# Patient Record
Sex: FEMALE
[De-identification: names, ages, dates, MRNs, and addresses within clinical notes are randomized per-mention and may not be internally consistent; named-entity substitution may affect disease eponyms.]

---

## 2020-10-09 PROBLEM — Z00.00 ENCOUNTER FOR PREVENTIVE HEALTH EXAMINATION: Status: ACTIVE | Noted: 2020-10-09

## 2020-10-14 ENCOUNTER — APPOINTMENT (OUTPATIENT)
Dept: BARIATRICS | Facility: CLINIC | Age: 25
End: 2020-10-14
Payer: MEDICAID

## 2020-10-14 VITALS
SYSTOLIC BLOOD PRESSURE: 129 MMHG | BODY MASS INDEX: 37.65 KG/M2 | WEIGHT: 278 LBS | HEIGHT: 72 IN | TEMPERATURE: 97.4 F | HEART RATE: 75 BPM | OXYGEN SATURATION: 97 % | DIASTOLIC BLOOD PRESSURE: 84 MMHG

## 2020-10-14 DIAGNOSIS — E78.1 PURE HYPERGLYCERIDEMIA: ICD-10-CM

## 2020-10-14 DIAGNOSIS — Z82.49 FAMILY HISTORY OF ISCHEMIC HEART DISEASE AND OTHER DISEASES OF THE CIRCULATORY SYSTEM: ICD-10-CM

## 2020-10-14 DIAGNOSIS — E28.2 POLYCYSTIC OVARIAN SYNDROME: ICD-10-CM

## 2020-10-14 DIAGNOSIS — E11.59 TYPE 2 DIABETES MELLITUS WITH OTHER CIRCULATORY COMPLICATIONS: ICD-10-CM

## 2020-10-14 DIAGNOSIS — E66.01 MORBID (SEVERE) OBESITY DUE TO EXCESS CALORIES: ICD-10-CM

## 2020-10-14 DIAGNOSIS — E11.9 TYPE 2 DIABETES MELLITUS W/OUT COMPLICATIONS: ICD-10-CM

## 2020-10-14 DIAGNOSIS — E03.9 HYPOTHYROIDISM, UNSPECIFIED: ICD-10-CM

## 2020-10-14 DIAGNOSIS — E78.00 PURE HYPERCHOLESTEROLEMIA, UNSPECIFIED: ICD-10-CM

## 2020-10-14 DIAGNOSIS — Z80.0 FAMILY HISTORY OF MALIGNANT NEOPLASM OF DIGESTIVE ORGANS: ICD-10-CM

## 2020-10-14 DIAGNOSIS — Z83.3 FAMILY HISTORY OF DIABETES MELLITUS: ICD-10-CM

## 2020-10-14 PROCEDURE — 99203 OFFICE O/P NEW LOW 30 MIN: CPT

## 2020-10-14 NOTE — ADDENDUM
[FreeTextEntry1] : This note was written by Melissa Ford on 08/19/2020 acting as scribe for Dr. Fragoso.

## 2020-10-14 NOTE — HISTORY OF PRESENT ILLNESS
[de-identified] : Nhi Nicole is a 23 y/o F who has a masters degree in English Literature, highly educated, applying for PhD. She was first seen by an endocrinologist at 8 years old when she was diagnosed with insulin resistance, placed on Metformin, and also diagnosed with hypothyroidism for which she is on Synthroid. Through adolescence she has been diagnosed with PCOS based on irregular menses, ovarian cysts, high testosterone, as well as inverted LH/FSH ratios on blood work. She has been counselled by dieticians since seeing an endocrinologist at the age of 8 as well as with her primary care doctor.  As result, I think it's rather remarkable that her BMI is only 39. Weight is 278 lb. Her current medications include metformin, omeprazole and Synthroid. She does report significant heart burn symptoms and will require repeat EGD.

## 2020-10-14 NOTE — END OF VISIT
[FreeTextEntry3] : All medical record entries made by the Scribe were at my, Dr. Fragoso's, discretion and personally dictated by me on 08/19/2020. I have reviewed the chart and agree that the record accurately reflects my personal performance of the history, physical exam, assessment and plan. I have also personally directed, reviewed and agreed to the chart.

## 2020-10-14 NOTE — ASSESSMENT
[FreeTextEntry1] : 25 y/o F with significant metabolic issues secondary to obesity, causing PCOS and insulin resistance, who has been under specialty care for 16 years. Surgery is the logical next step. Will require EGD to determine optimal surgical procedure. Is most interested in sleeve but I explained risk of exacerbating reflux. She has no mental status issues to contraindicate surgery. I feel we will have no issue educating her on postoperative behaviors and that it's unrealistic that any other treatment will be successful after being followed by endocrinologists since she was 8 years old.

## 2020-10-15 LAB
25(OH)D3 SERPL-MCNC: 13.5 NG/ML
ALBUMIN SERPL ELPH-MCNC: 4.8 G/DL
ALP BLD-CCNC: 58 U/L
ALT SERPL-CCNC: 26 U/L
ANION GAP SERPL CALC-SCNC: 10 MMOL/L
AST SERPL-CCNC: 25 U/L
BASOPHILS # BLD AUTO: 0.06 K/UL
BASOPHILS NFR BLD AUTO: 0.8 %
BILIRUB SERPL-MCNC: 0.5 MG/DL
BUN SERPL-MCNC: 11 MG/DL
CALCIUM SERPL-MCNC: 10.1 MG/DL
CHLORIDE SERPL-SCNC: 102 MMOL/L
CO2 SERPL-SCNC: 27 MMOL/L
CREAT SERPL-MCNC: 0.82 MG/DL
CRP SERPL-MCNC: 0.59 MG/DL
EOSINOPHIL # BLD AUTO: 0.07 K/UL
EOSINOPHIL NFR BLD AUTO: 0.9 %
ESTIMATED AVERAGE GLUCOSE: 120 MG/DL
FOLATE SERPL-MCNC: 12.9 NG/ML
GLUCOSE BS SERPL-MCNC: 97 MG/DL
GLUCOSE SERPL-MCNC: 100 MG/DL
HBA1C MFR BLD HPLC: 5.8 %
HCT VFR BLD CALC: 39.1 %
HGB BLD-MCNC: 12.1 G/DL
IMM GRANULOCYTES NFR BLD AUTO: 0.7 %
INSULIN SERPL-MCNC: 30.3 UU/ML
IRON SERPL-MCNC: 87 UG/DL
LYMPHOCYTES # BLD AUTO: 1.87 K/UL
LYMPHOCYTES NFR BLD AUTO: 24.6 %
MAN DIFF?: NORMAL
MCHC RBC-ENTMCNC: 26.1 PG
MCHC RBC-ENTMCNC: 30.9 GM/DL
MCV RBC AUTO: 84.4 FL
MONOCYTES # BLD AUTO: 0.44 K/UL
MONOCYTES NFR BLD AUTO: 5.8 %
NEUTROPHILS # BLD AUTO: 5.11 K/UL
NEUTROPHILS NFR BLD AUTO: 67.2 %
PLATELET # BLD AUTO: 328 K/UL
POTASSIUM SERPL-SCNC: 4.4 MMOL/L
PROT SERPL-MCNC: 7.1 G/DL
RBC # BLD: 4.63 M/UL
RBC # FLD: 16 %
SODIUM SERPL-SCNC: 140 MMOL/L
TSH SERPL-ACNC: 6.02 UIU/ML
VIT B12 SERPL-MCNC: 218 PG/ML
WBC # FLD AUTO: 7.6 K/UL

## 2020-10-20 LAB — VIT A SERPL-MCNC: 46.9 UG/DL

## 2020-10-21 LAB — VIT B1 SERPL-MCNC: 122.5 NMOL/L

## 2021-03-18 ENCOUNTER — NON-APPOINTMENT (OUTPATIENT)
Age: 26
End: 2021-03-18